# Patient Record
Sex: FEMALE | Race: WHITE | NOT HISPANIC OR LATINO | URBAN - METROPOLITAN AREA
[De-identification: names, ages, dates, MRNs, and addresses within clinical notes are randomized per-mention and may not be internally consistent; named-entity substitution may affect disease eponyms.]

---

## 2018-11-08 ENCOUNTER — IMPORTED ENCOUNTER (OUTPATIENT)
Dept: URBAN - METROPOLITAN AREA CLINIC 50 | Facility: CLINIC | Age: 63
End: 2018-11-08

## 2018-11-09 ENCOUNTER — IMPORTED ENCOUNTER (OUTPATIENT)
Dept: URBAN - METROPOLITAN AREA CLINIC 50 | Facility: CLINIC | Age: 63
End: 2018-11-09

## 2018-11-12 ENCOUNTER — IMPORTED ENCOUNTER (OUTPATIENT)
Dept: URBAN - METROPOLITAN AREA CLINIC 50 | Facility: CLINIC | Age: 63
End: 2018-11-12

## 2019-05-10 ENCOUNTER — IMPORTED ENCOUNTER (OUTPATIENT)
Dept: URBAN - METROPOLITAN AREA CLINIC 50 | Facility: CLINIC | Age: 64
End: 2019-05-10

## 2019-11-22 ENCOUNTER — IMPORTED ENCOUNTER (OUTPATIENT)
Dept: URBAN - METROPOLITAN AREA CLINIC 50 | Facility: CLINIC | Age: 64
End: 2019-11-22

## 2019-11-26 ENCOUNTER — IMPORTED ENCOUNTER (OUTPATIENT)
Dept: URBAN - METROPOLITAN AREA CLINIC 50 | Facility: CLINIC | Age: 64
End: 2019-11-26

## 2019-12-05 ENCOUNTER — IMPORTED ENCOUNTER (OUTPATIENT)
Dept: URBAN - METROPOLITAN AREA CLINIC 50 | Facility: CLINIC | Age: 64
End: 2019-12-05

## 2019-12-17 ENCOUNTER — IMPORTED ENCOUNTER (OUTPATIENT)
Dept: URBAN - METROPOLITAN AREA CLINIC 50 | Facility: CLINIC | Age: 64
End: 2019-12-17

## 2019-12-31 ENCOUNTER — IMPORTED ENCOUNTER (OUTPATIENT)
Dept: URBAN - METROPOLITAN AREA CLINIC 50 | Facility: CLINIC | Age: 64
End: 2019-12-31

## 2020-01-07 ENCOUNTER — IMPORTED ENCOUNTER (OUTPATIENT)
Dept: URBAN - METROPOLITAN AREA CLINIC 50 | Facility: CLINIC | Age: 65
End: 2020-01-07

## 2020-01-15 ENCOUNTER — IMPORTED ENCOUNTER (OUTPATIENT)
Dept: URBAN - METROPOLITAN AREA CLINIC 50 | Facility: CLINIC | Age: 65
End: 2020-01-15

## 2020-01-22 ENCOUNTER — IMPORTED ENCOUNTER (OUTPATIENT)
Dept: URBAN - METROPOLITAN AREA CLINIC 50 | Facility: CLINIC | Age: 65
End: 2020-01-22

## 2020-02-19 ENCOUNTER — IMPORTED ENCOUNTER (OUTPATIENT)
Dept: URBAN - METROPOLITAN AREA CLINIC 50 | Facility: CLINIC | Age: 65
End: 2020-02-19

## 2020-05-19 ENCOUNTER — IMPORTED ENCOUNTER (OUTPATIENT)
Dept: URBAN - METROPOLITAN AREA CLINIC 50 | Facility: CLINIC | Age: 65
End: 2020-05-19

## 2020-06-03 ENCOUNTER — IMPORTED ENCOUNTER (OUTPATIENT)
Dept: URBAN - METROPOLITAN AREA CLINIC 50 | Facility: CLINIC | Age: 65
End: 2020-06-03

## 2020-11-23 ENCOUNTER — IMPORTED ENCOUNTER (OUTPATIENT)
Dept: URBAN - METROPOLITAN AREA CLINIC 50 | Facility: CLINIC | Age: 65
End: 2020-11-23

## 2020-11-24 ENCOUNTER — IMPORTED ENCOUNTER (OUTPATIENT)
Dept: URBAN - METROPOLITAN AREA CLINIC 50 | Facility: CLINIC | Age: 65
End: 2020-11-24

## 2021-04-05 ENCOUNTER — IMPORTED ENCOUNTER (OUTPATIENT)
Dept: URBAN - METROPOLITAN AREA CLINIC 50 | Facility: CLINIC | Age: 66
End: 2021-04-05

## 2021-05-15 ASSESSMENT — VISUAL ACUITY
OS_CC: J2@ 15 IN
OD_CC: 20/50
OS_BAT: 20/40
OD_CC: J2@ 14 IN
OD_OTHER: 20/400.
OS_BAT: 20/60
OD_BAT: >20/400
OD_CC: J2@ 14 IN
OS_CC: J1@ 14 IN
OS_PH: 20/60-1
OD_CC: J1@ 14 IN
OD_CC: 20/60
OD_OTHER: 20/400.
OS_PH: 20/30
OS_PH: 20/40
OS_OTHER: >20/400.
OD_CC: 20/60
OS_BAT: >20/400
OD_OTHER: >20/400.
OS_CC: 20/60-1
OS_CC: J2@ 14 IN
OS_BAT: 20/40
OS_OTHER: 20/400. 20/400.
OS_OTHER: 20/400. 20/400.
OD_BAT: 20/40
OS_OTHER: 20/40. 20/70.
OS_SC: 20/40
OS_BAT: 20/400
OS_CC: J2@ 16 IN
OD_PH: 20/30
OS_CC: 20/50
OS_PH: 20/40-2
OS_PH: @ 14 IN
OD_PH: 20/40
OS_CC: J2
OD_CC: J2
OD_CC: J2
OS_OTHER: >20/400.
OS_CC: 20/30-2
OD_BAT: 20/400
OD_PH: 20/30
OD_PH: 20/50-1
OD_OTHER: 20/40. 20/80.
OS_CC: J2@ 14 IN
OD_PH: 20/50
OD_SC: 20/200
OS_SC: 20/40
OD_BAT: >20/400
OD_PH: @ 14 IN
OS_BAT: >20/400
OD_SC: 20/50-1
OS_SC: 20/60-1
OD_BAT: 20/50
OD_OTHER: >20/400.
OS_BAT: 20/400
OS_CC: 20/60
OD_CC: 20/50-1
OD_OTHER: 20/50. >20/400.
OD_CC: 20/30-1
OD_SC: 20/40
OS_CC: 20/30
OD_CC: 20/50
OS_CC: J2
OS_OTHER: 20/400.
OD_BAT: 20/400
OS_CC: 20/40
OS_OTHER: 20/40. >20/400.
OD_CC: J2@ 16 IN
OD_BAT: 20/60
OD_OTHER: 20/60. >20/400.
OD_CC: J2@ 15 IN
OD_SC: 20/30
OS_BAT: 20/400
OS_CC: 20/80-
OS_OTHER: 20/60. >20/400.
OD_PH: 20/40
OD_OTHER: 20/400.
OD_BAT: 20/400

## 2021-05-15 ASSESSMENT — TONOMETRY
OD_IOP_MMHG: 10
OS_IOP_MMHG: 16
OS_IOP_MMHG: 16
OD_IOP_MMHG: 14
OD_IOP_MMHG: 17
OD_IOP_MMHG: 18
OS_IOP_MMHG: 24
OD_IOP_MMHG: 15
OD_IOP_MMHG: 18
OD_IOP_MMHG: 16
OS_IOP_MMHG: 15
OD_IOP_MMHG: 16
OS_IOP_MMHG: 18
OS_IOP_MMHG: 14
OS_IOP_MMHG: 15
OS_IOP_MMHG: 16
OD_IOP_MMHG: 18
OS_IOP_MMHG: 27
OD_IOP_MMHG: 16
OS_IOP_MMHG: 20
OS_IOP_MMHG: 15

## 2021-05-24 ENCOUNTER — PREPPED CHART (OUTPATIENT)
Dept: URBAN - METROPOLITAN AREA CLINIC 50 | Facility: CLINIC | Age: 66
End: 2021-05-24

## 2021-05-24 VITALS — WEIGHT: 200 LBS | HEIGHT: 55 IN

## 2021-05-25 ENCOUNTER — 6 MONTH FOLLOW-UP (OUTPATIENT)
Dept: URBAN - METROPOLITAN AREA CLINIC 50 | Facility: CLINIC | Age: 66
End: 2021-05-25

## 2021-05-25 VITALS — HEIGHT: 55 IN | WEIGHT: 191 LBS

## 2021-05-25 DIAGNOSIS — H43.813: ICD-10-CM

## 2021-05-25 DIAGNOSIS — H26.493: ICD-10-CM

## 2021-05-25 PROCEDURE — 92014 COMPRE OPH EXAM EST PT 1/>: CPT

## 2021-05-25 ASSESSMENT — VISUAL ACUITY
OU_CC: J1
OD_CC: 20/30
OS_CC: 20/25
OU_CC: 20/25

## 2021-05-25 ASSESSMENT — TONOMETRY
OS_IOP_MMHG: 14
OD_IOP_MMHG: 14

## 2021-06-07 NOTE — PATIENT DISCUSSION
GLAUCOMA SUSPECT, OU BASED ON OPTIC NERVE CUPPING. IOP 21, OU. OCT NEXT VISIT TO CHECK FOR RNFL THINNING. VISUAL FIELD NEXT VISIT TO CHECK FOR VISUAL FIELD LOSS. PER PT LAST VF MAY 2021. WILL REQUEST FOR REVIEW PRIOR TO SURGERY FROM DOCTOR IN [facility].

## 2021-06-07 NOTE — PATIENT DISCUSSION
Discussed the option of a Goniotomy/KDB, such as that [facility]. A KDB is a micro-invasive glaucoma surgery performed by removing a section of trabecular tissue giving aqueous direct access to the  channels and the distal outflow system. The goal of the procedure is to lower pressure and prevent further damage to the optic nerve. Patient understands and will decide on KDB.

## 2021-06-09 NOTE — PATIENT DISCUSSION
REFRACTIVE ERROR- OPTS DISC W/ PT. PT UNDERSTANDS AND ELECTS TO PROCEED W/ ADVANCED CATARACT SURGERY.

## 2021-07-23 ENCOUNTER — DIAGNOSTICS - HVF/OCT/PACH (OUTPATIENT)
Dept: URBAN - METROPOLITAN AREA CLINIC 50 | Facility: CLINIC | Age: 66
End: 2021-07-23

## 2021-07-23 DIAGNOSIS — Z79.899: ICD-10-CM

## 2021-07-23 PROCEDURE — 92134 CPTRZ OPH DX IMG PST SGM RTA: CPT

## 2021-07-23 PROCEDURE — 92082 INTERMEDIATE VISUAL FIELD XM: CPT

## 2021-08-02 ENCOUNTER — CONTACT LENS FITTING NEW (OUTPATIENT)
Dept: URBAN - METROPOLITAN AREA CLINIC 53 | Facility: CLINIC | Age: 66
End: 2021-08-02

## 2021-08-02 DIAGNOSIS — H52.4: ICD-10-CM

## 2021-08-02 DIAGNOSIS — Z79.899: ICD-10-CM

## 2021-08-02 PROCEDURE — CLF EW SOF CL FIT, EW, SOFT, MF/MONO

## 2021-08-02 ASSESSMENT — VISUAL ACUITY
OD_CC: 20/30
OS_CC: 20/20-2
OU_CC: 20/20-1

## 2021-08-02 ASSESSMENT — KERATOMETRY
OD_AXISANGLE2_DEGREES: 8
OS_AXISANGLE_DEGREES: 133
OS_K1POWER_DIOPTERS: 46.75
OS_K2POWER_DIOPTERS: 45.25
OD_K2POWER_DIOPTERS: 46.00
OD_K1POWER_DIOPTERS: 48.25
OS_AXISANGLE2_DEGREES: 43
OD_AXISANGLE_DEGREES: 098

## 2021-08-04 NOTE — PATIENT DISCUSSION
Surgery  Counseling: I have discussed the option of glasses versus cataract surgery versus following . It was explained that when vision no longer meets the patient's visual needs and a new prescription for glasses is not likely to improve all of the patient's visual symptoms, the option of cataract surgery is a reasonable next step. It was explained that there is no guarantee that removing the cataract will improve their visual symptoms, however; it is believed that the cataract is contributing to the patient's visual impairment and surgery may significantly improve both the visual and functional status of the patient. The risks, benefits and alternatives of surgery were discussed with the patient. After this discussion, the patient desires to proceed with cataract surgery with implantation of an intraocular lens to improve vision .

## 2024-08-19 NOTE — PATIENT DISCUSSION
8/19/2024    Chief Complaint   Patient presents with    Bleeding/Bruising     C/o easy bruising 3-4 weeks. States they just pop it and they are itchy at times. Pt also has a knot on her forehead       Belen Barbosa is a 54 y.o. female, presents today for evaluation of bruising and knot on forehead.    HPI   Bruising  Onset: she reports she \"has always been an easy bruiser\". The last 3 weeks bruising has worsened, \"popping up every where\".   Location: Arms, legs. Occasionally bruised area itches and will swell.   Denies injury or trauma.  Denies domestic violence.    Patient takes a daily ASA 81 mg that she has taken since she was diagnosed with diabetes.  Patient reports she feels fine other than having bruises.  Denies family history or personal history of clotting disorder.      Knot on Forehead  First noticed 2-3 months ago. Knots are intermittent, always occurs on forehead. Similar to a pimple but not a pimple. Knot that developed yesterday has almost resolved. Knot is painful. Knots vary in size. Occurs at least once monthly. Patient is unsure of cause. Denies injury of trauma.     Review of Systems   Constitutional:  Negative for activity change, appetite change, diaphoresis, fatigue and unexpected weight change.   Respiratory:  Negative for cough, chest tightness, shortness of breath and wheezing.    Cardiovascular:  Negative for chest pain, palpitations and leg swelling.   Gastrointestinal: Negative.    Skin:  Positive for color change (Bruising).   Neurological:  Negative for dizziness, facial asymmetry, weakness, light-headedness, numbness and headaches.       Current Outpatient Medications on File Prior to Visit   Medication Sig Dispense Refill    Tirzepatide (MOUNJARO) 10 MG/0.5ML SOPN SC injection Inject 0.5 mLs into the skin once a week 4 Adjustable Dose Pre-filled Pen Syringe 4    EPINEPHrine (EPIPEN 2-HECTOR) 0.3 MG/0.3ML SOAJ injection Inject Epipen into leg once for allergic reaction to  Refractive error / Presbyopic Correction Counseling: